# Patient Record
Sex: FEMALE | Race: WHITE | NOT HISPANIC OR LATINO | Employment: FULL TIME | URBAN - METROPOLITAN AREA
[De-identification: names, ages, dates, MRNs, and addresses within clinical notes are randomized per-mention and may not be internally consistent; named-entity substitution may affect disease eponyms.]

---

## 2024-10-22 ENCOUNTER — OFFICE VISIT (OUTPATIENT)
Dept: URGENT CARE | Facility: CLINIC | Age: 39
End: 2024-10-22

## 2024-10-22 DIAGNOSIS — B96.89 BACTERIAL VAGINOSIS: Primary | ICD-10-CM

## 2024-10-22 DIAGNOSIS — N76.0 BACTERIAL VAGINOSIS: Primary | ICD-10-CM

## 2024-10-22 RX ORDER — METRONIDAZOLE 500 MG/1
500 TABLET ORAL EVERY 8 HOURS SCHEDULED
Qty: 21 TABLET | Refills: 0 | Status: SHIPPED | OUTPATIENT
Start: 2024-10-22 | End: 2024-10-29

## 2024-10-22 NOTE — PATIENT INSTRUCTIONS
We saw you today for likely bacterial vaginosis.  As we discussed, we are unable to test you for that today.  Trichomonas is another possible diagnosis.  The treatment for both bacterial vaginosis and trichomonas is the same.  You do have an appointment scheduled with your gynecologist. Should you have return of symptoms or if your symptoms do not resolve, see your gynecologist sooner.    Do not drink alcohol while taking this medication as it will make you really sick.  Take the medication as prescribed and until completed.

## 2024-10-22 NOTE — PROGRESS NOTES
Assessment/Plan    Bacterial vaginosis [N76.0, B96.89]  1. Bacterial vaginosis  metroNIDAZOLE (FLAGYL) 500 mg tablet            Subjective:     Patient ID: Maria D Russell is a 38 y.o. female.      Reason For Visit / Chief Complaint  Chief Complaint   Patient presents with    Vaginal Discharge         This is a 38-year-old female patient who presents to the wellness center.  Patient is complaining of a foul-smelling discharge for approximately 3 days.  Patient states that she finished her.  Approximately 1 week ago patient denies fever or chills.  Patient denies abdominal pain or difficulty urinating.  Patient has 1 sexual partner.  She does report that they did have a.  Where they were not together for some time.  Patient states that she has had bacterial vaginosis in the past feels similar.    Vaginal Discharge  The patient's primary symptoms include a genital odor and vaginal discharge. The patient's pertinent negatives include no genital itching, genital rash or pelvic pain. This is a new problem. The current episode started in the past 7 days. The patient is experiencing no pain. She is not pregnant. Pertinent negatives include no abdominal pain, chills, dysuria, fever, flank pain, frequency, headaches, hematuria or urgency. The vaginal discharge was clear and malodorous. She is sexually active. It is possible that her partner has an STD.       Past Medical History:   Diagnosis Date    Allergic rhinitis     Anxiety     Depression        Past Surgical History:   Procedure Laterality Date    BREAST EXCISIONAL BIOPSY Right 2003    TONSILLECTOMY      VAGINAL DELIVERY         No family history on file.    Review of Systems   Constitutional:  Negative for chills and fever.   Gastrointestinal:  Negative for abdominal pain.   Genitourinary:  Positive for vaginal discharge. Negative for decreased urine volume, difficulty urinating, dyspareunia, dysuria, flank pain, frequency, genital sores, hematuria, menstrual  problem, pelvic pain and urgency.   Neurological:  Negative for headaches.       Objective:    There were no vitals taken for this visit.    Physical Exam  Constitutional:       Appearance: Normal appearance. She is normal weight.   HENT:      Head: Normocephalic.   Pulmonary:      Breath sounds: Normal breath sounds.   Musculoskeletal:         General: Normal range of motion.   Skin:     General: Skin is warm and dry.   Neurological:      Mental Status: She is alert and oriented to person, place, and time.   Psychiatric:         Mood and Affect: Mood normal.       Patient is aware that we cannot do testing for bacterial vaginosis at [facility] center.  Patient is also aware that her symptoms can be related to trichomonas.  Treatment for bacterial vaginosis and trichomonas are similar.  Flagyl will cover both.  We will treat patient today for symptoms.  Patient is aware that she does need to follow-up with her gynecologist and will need to watch out for repeat/recurrent infection.  Patient is a good historian. She has an appointment in January with her gynecologist but will try to go sooner.

## 2024-10-25 ENCOUNTER — OFFICE VISIT (OUTPATIENT)
Dept: URGENT CARE | Age: 39
End: 2024-10-25
Payer: COMMERCIAL

## 2024-10-25 VITALS
SYSTOLIC BLOOD PRESSURE: 136 MMHG | WEIGHT: 140.4 LBS | TEMPERATURE: 97.6 F | HEART RATE: 54 BPM | OXYGEN SATURATION: 99 % | BODY MASS INDEX: 26.51 KG/M2 | HEIGHT: 61 IN | DIASTOLIC BLOOD PRESSURE: 78 MMHG | RESPIRATION RATE: 18 BRPM

## 2024-10-25 DIAGNOSIS — H57.89 IRRITATION OF LEFT EYE: Primary | ICD-10-CM

## 2024-10-25 PROCEDURE — 99213 OFFICE O/P EST LOW 20 MIN: CPT | Performed by: EMERGENCY MEDICINE

## 2024-10-25 RX ORDER — AZELASTINE HYDROCHLORIDE 0.5 MG/ML
1 SOLUTION/ DROPS OPHTHALMIC 2 TIMES DAILY
Qty: 6 ML | Refills: 1 | Status: SHIPPED | OUTPATIENT
Start: 2024-10-25 | End: 2024-11-24

## 2024-10-25 RX ORDER — ERYTHROMYCIN 5 MG/G
0.5 OINTMENT OPHTHALMIC
Qty: 7 G | Refills: 0 | Status: SHIPPED | OUTPATIENT
Start: 2024-10-25 | End: 2024-10-25

## 2024-10-25 RX ORDER — ERYTHROMYCIN 5 MG/G
0.5 OINTMENT OPHTHALMIC 4 TIMES DAILY
Qty: 28 G | Refills: 0 | Status: SHIPPED | OUTPATIENT
Start: 2024-10-25 | End: 2024-11-01

## 2024-10-25 NOTE — PROGRESS NOTES
[facility]        NAME: Maria D Espinal is a 38 y.o. female  : 1985    MRN: [id number]  DATE: 2024  TIME: 5:07 PM    Assessment and Plan   Irritation of left eye [H57.89]  1. Irritation of left eye  azelastine (OPTIVAR) 0.05 % ophthalmic solution    erythromycin (ILOTYCIN) ophthalmic ointment    DISCONTINUED: erythromycin (ILOTYCIN) ophthalmic ointment            Patient Instructions       Follow up with PCP in 3-5 days.  Proceed to  ER if symptoms worsen.    If tests have been performed at [facility], our office will contact you with results if changes need to be made to the care plan discussed with you at the visit.  [facility].    Chief Complaint     Chief Complaint   Patient presents with    Eye Pain     C/o left eye irritation starting about a half hour ago reports that her eye became itchy and then red and swollen.          History of Present Illness       Eye Pain   The left eye is affected. This is a new problem. The current episode started today. The problem occurs constantly. The problem has been waxing and waning. There was no injury mechanism. The pain is at a severity of 0/10. The patient is experiencing no pain. There is No known exposure to pink eye. She Does not wear contacts. Associated symptoms include an eye discharge, eye redness and itching. Pertinent negatives include no blurred vision, double vision, fever, foreign body sensation, nausea, photophobia, recent URI, vomiting or weakness. She has tried nothing for the symptoms.       Review of Systems   Review of Systems   Constitutional:  Negative for activity change, appetite change, chills, diaphoresis, fatigue, fever and unexpected weight change.   HENT:  Negative for congestion, dental problem, drooling, ear discharge, ear pain, facial swelling, hearing loss, mouth sores, nosebleeds, postnasal drip, rhinorrhea, sinus pressure, sinus pain, sneezing, sore throat,  tinnitus, trouble swallowing and voice change.    Eyes:  Positive for pain, discharge, redness and itching. Negative for blurred vision, double vision, photophobia and visual disturbance.   Respiratory:  Negative for cough and shortness of breath.    Cardiovascular:  Negative for chest pain, palpitations and leg swelling.   Gastrointestinal:  Negative for abdominal distention, abdominal pain, anal bleeding, blood in stool, constipation, diarrhea, nausea, rectal pain and vomiting.   Genitourinary:  Negative for dysuria and hematuria.   Musculoskeletal:  Negative for arthralgias and back pain.   Skin:  Negative for color change, pallor, rash and wound.   Neurological:  Negative for dizziness, tremors, seizures, syncope, facial asymmetry, speech difficulty, weakness, light-headedness, numbness and headaches.   All other systems reviewed and are negative.        Current Medications       Current Outpatient Medications:     azelastine (OPTIVAR) 0.05 % ophthalmic solution, Administer 1 drop into the left eye 2 (two) times a day, Disp: 6 mL, Rfl: 1    erythromycin (ILOTYCIN) ophthalmic ointment, Administer 0.5 inches into the left eye 4 (four) times a day for 7 days, Disp: 28 g, Rfl: 0    albuterol (Ventolin HFA) 90 mcg/act inhaler, Inhale 2 puffs every 6 (six) hours as needed for wheezing, Disp: 18 g, Rfl: 0    ALPRAZolam (XANAX) 0.5 mg tablet, Take 0.5 mg by mouth 2 (two) times a day, Disp: , Rfl:     desogestrel-ethinyl estradiol (KARIVA) 0.15-0.02/0.01 MG (21/5) per tablet, Take 1 tablet by mouth (Patient not taking: Reported on 6/12/2023), Disp: , Rfl:     fluticasone (FLONASE) 50 mcg/act nasal spray, 2 sprays into each nostril daily, Disp: 9.9 mL, Rfl: 0    loratadine (CLARITIN) 10 mg tablet, Take 1 tablet (10 mg total) by mouth daily, Disp: 30 tablet, Rfl: 0    metroNIDAZOLE (FLAGYL) 500 mg tablet, Take 1 tablet (500 mg total) by mouth every 8 (eight) hours for 7 days, Disp: 21 tablet, Rfl: 0    PARoxetine (PAXIL)  "40 MG tablet, Take 40 mg by mouth, Disp: , Rfl:     Propylene Glycol (Systane Complete) 0.6 % SOLN, Apply 1 drop to eye 2 (two) times a day, Disp: 15 mL, Rfl: 0    QUEtiapine (SEROquel) 25 mg tablet, Take 25 mg by mouth, Disp: , Rfl:     Current Allergies     Allergies as of 10/25/2024 - Reviewed 10/25/2024   Allergen Reaction Noted    Prednisone Other (See Comments) 05/31/2009            The following portions of the patient's history were reviewed and updated as appropriate: allergies, current medications, past family history, past medical history, past social history, past surgical history and problem list.     Past Medical History:   Diagnosis Date    Allergic rhinitis     Anxiety     Depression        Past Surgical History:   Procedure Laterality Date    BREAST EXCISIONAL BIOPSY Right 2003    TONSILLECTOMY      VAGINAL DELIVERY         No family history on file.      Medications have been verified.        Objective   /78   Pulse (!) 54   Temp 97.6 °F (36.4 °C) (Tympanic)   Resp 18   Ht 5' 1\" (1.549 m)   Wt 63.7 kg (140 lb 6.4 oz)   SpO2 99%   BMI 26.53 kg/m²   No LMP recorded.       Physical Exam     Physical Exam  Vitals and nursing note reviewed.   Constitutional:       General: She is not in acute distress.     Appearance: Normal appearance. She is normal weight. She is not ill-appearing, toxic-appearing or diaphoretic.   HENT:      Head: Normocephalic and atraumatic.      Right Ear: External ear normal.      Left Ear: External ear normal.      Nose: Nose normal.      Mouth/Throat:      Mouth: Mucous membranes are moist.      Pharynx: No oropharyngeal exudate or posterior oropharyngeal erythema.   Eyes:      General: Lids are normal. Lids are everted, no foreign bodies appreciated. Vision grossly intact. Gaze aligned appropriately. No allergic shiner, visual field deficit or scleral icterus.        Right eye: No foreign body, discharge or hordeolum.         Left eye: No foreign body, discharge " or hordeolum.      Extraocular Movements: Extraocular movements intact.      Right eye: Normal extraocular motion and no nystagmus.      Left eye: Normal extraocular motion and no nystagmus.      Conjunctiva/sclera:      Right eye: Right conjunctiva is not injected. No chemosis, exudate or hemorrhage.     Left eye: Left conjunctiva is injected. No chemosis, exudate or hemorrhage.     Pupils: Pupils are equal, round, and reactive to light. Pupils are equal.      Visual Fields: Right eye visual fields normal and left eye visual fields normal.   Cardiovascular:      Rate and Rhythm: Normal rate and regular rhythm.      Pulses: Normal pulses.           Carotid pulses are 2+ on the right side and 2+ on the left side.       Radial pulses are 2+ on the right side and 2+ on the left side.      Heart sounds: No murmur heard.     No friction rub. No gallop.   Pulmonary:      Effort: Pulmonary effort is normal. No respiratory distress.      Breath sounds: Normal breath sounds. No stridor. No wheezing, rhonchi or rales.   Chest:      Chest wall: No tenderness.   Abdominal:      General: Abdomen is flat.      Palpations: Abdomen is soft.   Musculoskeletal:         General: No swelling, tenderness, deformity or signs of injury.      Cervical back: Normal range of motion and neck supple.      Right lower leg: No edema.      Left lower leg: No edema.   Skin:     General: Skin is warm and dry.   Neurological:      General: No focal deficit present.      Mental Status: She is alert and oriented to person, place, and time.      Cranial Nerves: No cranial nerve deficit.      Sensory: No sensory deficit.      Motor: No weakness.      Coordination: Coordination normal.      Gait: Gait normal.   Psychiatric:         Mood and Affect: Mood normal.         Behavior: Behavior normal.

## 2025-01-30 ENCOUNTER — OFFICE VISIT (OUTPATIENT)
Dept: URGENT CARE | Age: 40
End: 2025-01-30
Payer: COMMERCIAL

## 2025-01-30 VITALS
BODY MASS INDEX: 26.45 KG/M2 | SYSTOLIC BLOOD PRESSURE: 102 MMHG | OXYGEN SATURATION: 98 % | HEART RATE: 85 BPM | DIASTOLIC BLOOD PRESSURE: 70 MMHG | RESPIRATION RATE: 18 BRPM | WEIGHT: 140 LBS | TEMPERATURE: 98.1 F

## 2025-01-30 DIAGNOSIS — H57.89 IRRITATION OF BOTH EYES: Primary | ICD-10-CM

## 2025-01-30 PROCEDURE — 99213 OFFICE O/P EST LOW 20 MIN: CPT | Performed by: EMERGENCY MEDICINE

## 2025-01-30 RX ORDER — OLOPATADINE HYDROCHLORIDE 1 MG/ML
1 SOLUTION/ DROPS OPHTHALMIC 2 TIMES DAILY
Qty: 5 ML | Refills: 3 | Status: SHIPPED | OUTPATIENT
Start: 2025-01-30 | End: 2025-03-01

## 2025-01-30 RX ORDER — ERYTHROMYCIN 5 MG/G
0.5 OINTMENT OPHTHALMIC
Qty: 7 G | Refills: 0 | Status: SHIPPED | OUTPATIENT
Start: 2025-01-30 | End: 2025-02-06

## 2025-01-30 RX ORDER — FLUOXETINE 40 MG/1
40 CAPSULE ORAL DAILY
COMMUNITY

## 2025-01-30 RX ORDER — OLOPATADINE HYDROCHLORIDE 1 MG/ML
1 SOLUTION/ DROPS OPHTHALMIC 2 TIMES DAILY
Qty: 5 ML | Refills: 3 | Status: SHIPPED | OUTPATIENT
Start: 2025-01-30 | End: 2025-01-30 | Stop reason: SDUPTHER

## 2025-01-30 NOTE — PROGRESS NOTES
[facility]        NAME: Maria D Russell is a 39 y.o. female  : 1985    MRN: [id number]  DATE: 2025  TIME: 9:22 AM    Assessment and Plan   Irritation of both eyes [H57.89]  1. Irritation of both eyes  erythromycin (ILOTYCIN) ophthalmic ointment    olopatadine (PATANOL) 0.1 % ophthalmic solution    DISCONTINUED: olopatadine (PATANOL) 0.1 % ophthalmic solution        Patient's vision is grossly intact bilaterally to confrontation.  Patient states that she has an appointment with ophthalmologist next month.  Will add Pataday for acute management of symptoms.  Patient also given erythromycin ointment to utilize nightly.  Advised patient otherwise seek care in ED if she develops significantly worsening symptoms to include headache, fever or changes in vision or vomiting.  All questions answered at bedside, patient was amenable to plan and voiced understanding.    Patient Instructions       Follow up with PCP in 3-5 days.  Proceed to  ER if symptoms worsen.    If tests have been performed at [facility], our office will contact you with results if changes need to be made to the care plan discussed with you at the visit.  You can review your full results on [facility].    Chief Complaint     Chief Complaint   Patient presents with    eye irritation     Started last Saturday with bilateral eye irritation, redness, itching. Hx of dry eye.  Attempted Benadryl, Systane and eye ointment prescribed in past.  Denies pain, change in vision.  Non contact lens wearer.           History of Present Illness       39-year-old female with history of allergic rhinitis, chronic dry eyes presents with a chief complaint of bilateral, left greater than right eye redness, irritation clear tearing itchiness and dryness worsening usual with onset of 1 week ago.  Patient seen in clinic for similar issue in the past for which she was prescribed azelastine eyedrops and erythromycin ointment which she states did  help previously.  She denies contact lens use, headaches, fever, visual changes or loss of vision.  Denies eye trauma or pain.    Eye Problem   Both eyes are affected. This is a recurrent problem. The current episode started in the past 7 days. The problem occurs constantly. The problem has been waxing and waning. There was no injury mechanism. The pain is at a severity of 0/10. The patient is experiencing no pain. There is No known exposure to pink eye. She Does not wear contacts. Associated symptoms include an eye discharge, eye redness and itching. Pertinent negatives include no blurred vision, double vision, fever, foreign body sensation, nausea, photophobia, recent URI, vomiting or weakness. She has tried eye drops for the symptoms. The treatment provided mild relief.       Review of Systems   Review of Systems   Constitutional:  Negative for activity change, appetite change, chills, diaphoresis, fatigue, fever and unexpected weight change.   HENT:  Negative for congestion, dental problem, drooling, ear discharge, ear pain, facial swelling, hearing loss, mouth sores, nosebleeds, postnasal drip, rhinorrhea, sinus pressure, sinus pain, sneezing, sore throat, tinnitus, trouble swallowing and voice change.    Eyes:  Positive for discharge, redness and itching. Negative for blurred vision, double vision, photophobia, pain and visual disturbance.   Respiratory:  Negative for apnea, cough, choking, chest tightness, shortness of breath, wheezing and stridor.    Cardiovascular:  Negative for chest pain and palpitations.   Gastrointestinal:  Negative for abdominal pain, nausea and vomiting.   Genitourinary:  Negative for dysuria and hematuria.   Musculoskeletal:  Negative for arthralgias and back pain.   Skin:  Negative for color change and rash.   Neurological:  Negative for dizziness, tremors, seizures, syncope, facial asymmetry, speech difficulty, weakness, light-headedness, numbness and headaches.   All other systems  reviewed and are negative.        Current Medications       Current Outpatient Medications:     ALPRAZolam (XANAX) 0.5 mg tablet, Take 0.5 mg by mouth 2 (two) times a day, Disp: , Rfl:     erythromycin (ILOTYCIN) ophthalmic ointment, Administer 0.5 inches to both eyes daily at bedtime for 7 days, Disp: 7 g, Rfl: 0    FLUoxetine (PROzac) 40 MG capsule, Take 40 mg by mouth daily, Disp: , Rfl:     olopatadine (PATANOL) 0.1 % ophthalmic solution, Administer 1 drop to both eyes 2 (two) times a day, Disp: 5 mL, Rfl: 3    Propylene Glycol (Systane Complete) 0.6 % SOLN, Apply 1 drop to eye 2 (two) times a day, Disp: 15 mL, Rfl: 0    QUEtiapine (SEROquel) 25 mg tablet, Take 25 mg by mouth, Disp: , Rfl:     albuterol (Ventolin HFA) 90 mcg/act inhaler, Inhale 2 puffs every 6 (six) hours as needed for wheezing (Patient not taking: Reported on 1/30/2025), Disp: 18 g, Rfl: 0    azelastine (OPTIVAR) 0.05 % ophthalmic solution, Administer 1 drop into the left eye 2 (two) times a day, Disp: 6 mL, Rfl: 1    desogestrel-ethinyl estradiol (KARIVA) 0.15-0.02/0.01 MG (21/5) per tablet, Take 1 tablet by mouth (Patient not taking: Reported on 1/30/2025), Disp: , Rfl:     fluticasone (FLONASE) 50 mcg/act nasal spray, 2 sprays into each nostril daily, Disp: 9.9 mL, Rfl: 0    loratadine (CLARITIN) 10 mg tablet, Take 1 tablet (10 mg total) by mouth daily (Patient not taking: Reported on 1/30/2025), Disp: 30 tablet, Rfl: 0    PARoxetine (PAXIL) 40 MG tablet, Take 40 mg by mouth (Patient not taking: Reported on 1/30/2025), Disp: , Rfl:     Current Allergies     Allergies as of 01/30/2025 - Reviewed 01/30/2025   Allergen Reaction Noted    Prednisone Other (See Comments) 05/31/2009            The following portions of the patient's history were reviewed and updated as appropriate: allergies, current medications, past family history, past medical history, past social history, past surgical history and problem list.     Past Medical History:    Diagnosis Date    Allergic rhinitis     Anxiety     Depression        Past Surgical History:   Procedure Laterality Date    BREAST EXCISIONAL BIOPSY Right 2003    TONSILLECTOMY      VAGINAL DELIVERY         No family history on file.      Medications have been verified.        Objective   /70   Pulse 85   Temp 98.1 °F (36.7 °C)   Resp 18   Wt 63.5 kg (140 lb)   LMP 01/16/2025 (Approximate)   SpO2 98%   BMI 26.45 kg/m²   Patient's last menstrual period was 01/16/2025 (approximate).       Physical Exam     Physical Exam  Vitals and nursing note reviewed.   Constitutional:       General: She is not in acute distress.     Appearance: Normal appearance. She is normal weight. She is not ill-appearing, toxic-appearing or diaphoretic.   HENT:      Head: Normocephalic and atraumatic.      Right Ear: Tympanic membrane, ear canal and external ear normal. There is no impacted cerumen.      Left Ear: Tympanic membrane, ear canal and external ear normal. There is no impacted cerumen.      Nose: Nose normal.      Mouth/Throat:      Mouth: Mucous membranes are moist.      Pharynx: No oropharyngeal exudate or posterior oropharyngeal erythema.   Eyes:      General: Lids are normal. Vision grossly intact. Gaze aligned appropriately. No allergic shiner, visual field deficit or scleral icterus.        Right eye: No foreign body, discharge or hordeolum.         Left eye: No foreign body, discharge or hordeolum.      Extraocular Movements: Extraocular movements intact.      Right eye: Normal extraocular motion and no nystagmus.      Left eye: Normal extraocular motion and no nystagmus.      Conjunctiva/sclera:      Right eye: Right conjunctiva is injected. No chemosis, exudate or hemorrhage.     Left eye: Left conjunctiva is injected. No chemosis, exudate or hemorrhage.     Pupils: Pupils are equal, round, and reactive to light.      Comments: Mild, bilateral conjunctival injection noted.  There is no APD, proptosis,  periorbital swelling, or pain with extraocular movements noted.   Cardiovascular:      Rate and Rhythm: Normal rate and regular rhythm.      Pulses: Normal pulses.           Carotid pulses are 2+ on the right side and 2+ on the left side.       Radial pulses are 2+ on the right side and 2+ on the left side.      Heart sounds: No murmur heard.     No friction rub. No gallop.   Pulmonary:      Effort: Pulmonary effort is normal. No respiratory distress.      Breath sounds: Normal breath sounds. No stridor. No wheezing, rhonchi or rales.   Chest:      Chest wall: No tenderness.   Abdominal:      General: Abdomen is flat.      Palpations: Abdomen is soft.   Musculoskeletal:         General: No swelling, tenderness, deformity or signs of injury.      Cervical back: Normal range of motion and neck supple.      Right lower leg: No edema.      Left lower leg: No edema.   Skin:     General: Skin is warm and dry.      Capillary Refill: Capillary refill takes less than 2 seconds.      Coloration: Skin is not jaundiced or pale.      Findings: No bruising, erythema, lesion or rash.   Neurological:      General: No focal deficit present.      Mental Status: She is alert and oriented to person, place, and time.      Cranial Nerves: No cranial nerve deficit.      Sensory: No sensory deficit.      Motor: No weakness.      Coordination: Coordination normal.      Gait: Gait normal.   Psychiatric:         Mood and Affect: Mood normal.         Behavior: Behavior normal.

## 2025-04-01 ENCOUNTER — OFFICE VISIT (OUTPATIENT)
Dept: URGENT CARE | Facility: CLINIC | Age: 40
End: 2025-04-01

## 2025-04-01 VITALS
DIASTOLIC BLOOD PRESSURE: 70 MMHG | TEMPERATURE: 98 F | RESPIRATION RATE: 18 BRPM | HEART RATE: 61 BPM | SYSTOLIC BLOOD PRESSURE: 115 MMHG

## 2025-04-01 DIAGNOSIS — J30.1 SEASONAL ALLERGIC RHINITIS DUE TO POLLEN: Primary | ICD-10-CM

## 2025-04-01 RX ORDER — FLUTICASONE PROPIONATE 50 MCG
1 SPRAY, SUSPENSION (ML) NASAL DAILY
Qty: 18.2 ML | Refills: 0 | Status: SHIPPED | OUTPATIENT
Start: 2025-04-01

## 2025-04-01 RX ORDER — LORATADINE 10 MG/1
10 TABLET ORAL DAILY
Qty: 30 TABLET | Refills: 0 | Status: SHIPPED | OUTPATIENT
Start: 2025-04-01

## 2025-04-01 NOTE — PROGRESS NOTES
NAME: Maria D Russell is a 39 y.o. female  : 1985    MRN: [id number]  DATE: 2025  TIME: 8:51 AM    Assessment and Plan   Seasonal allergic rhinitis due to pollen [J30.1]  1. Seasonal allergic rhinitis due to pollen  loratadine (CLARITIN) 10 mg tablet    fluticasone (FLONASE) 50 mcg/act nasal spray        Advised to use saline sinus rinse bid and follow with Flonase bid. Restart Claritin daily. Symptoms and exam consistent with flare of seasonal allergies due to recent weather changes, no signs of bacterial infection on exam.     Patient Instructions       Follow up with PCP in 3-5 days.  Proceed to  ER if symptoms worsen.    If tests are performed, our office will contact you with results only if changes need to made to the care plan discussed with you at the visit. You can review your full results on [facility] Mychart.    Chief Complaint     Chief Complaint   Patient presents with    Sore Throat     Sinus symptoms         History of Present Illness       Patient states she does have allergies and symptoms flare this time of year. She has not been taking any allergy medication bc she cannot afford to buy it otc.     Sinusitis  This is a new problem. The current episode started in the past 7 days (3 days). The problem is unchanged. There has been no fever. Associated symptoms include congestion and a sore throat. Pertinent negatives include no chills, coughing, diaphoresis, ear pain, headaches, hoarse voice, neck pain, shortness of breath, sinus pressure, sneezing or swollen glands. Past treatments include nothing. The treatment provided no relief.       Review of Systems   Review of Systems   Constitutional:  Negative for chills and diaphoresis.   HENT:  Positive for congestion, postnasal drip, rhinorrhea and sore throat. Negative for ear pain, hoarse voice, sinus pressure and sneezing.    Respiratory:  Negative for cough and shortness of breath.    Musculoskeletal:  Negative for neck pain.    Neurological:  Negative for headaches.         Current Medications       Current Outpatient Medications:     fluticasone (FLONASE) 50 mcg/act nasal spray, 1 spray into each nostril daily, Disp: 18.2 mL, Rfl: 0    loratadine (CLARITIN) 10 mg tablet, Take 1 tablet (10 mg total) by mouth daily, Disp: 30 tablet, Rfl: 0    albuterol (Ventolin HFA) 90 mcg/act inhaler, Inhale 2 puffs every 6 (six) hours as needed for wheezing (Patient not taking: Reported on 1/30/2025), Disp: 18 g, Rfl: 0    ALPRAZolam (XANAX) 0.5 mg tablet, Take 0.5 mg by mouth 2 (two) times a day, Disp: , Rfl:     azelastine (OPTIVAR) 0.05 % ophthalmic solution, Administer 1 drop into the left eye 2 (two) times a day, Disp: 6 mL, Rfl: 1    desogestrel-ethinyl estradiol (KARIVA) 0.15-0.02/0.01 MG (21/5) per tablet, Take 1 tablet by mouth (Patient not taking: Reported on 1/30/2025), Disp: , Rfl:     FLUoxetine (PROzac) 40 MG capsule, Take 40 mg by mouth daily, Disp: , Rfl:     loratadine (CLARITIN) 10 mg tablet, Take 1 tablet (10 mg total) by mouth daily (Patient not taking: Reported on 1/30/2025), Disp: 30 tablet, Rfl: 0    olopatadine (PATANOL) 0.1 % ophthalmic solution, Administer 1 drop to both eyes 2 (two) times a day, Disp: 5 mL, Rfl: 3    PARoxetine (PAXIL) 40 MG tablet, Take 40 mg by mouth (Patient not taking: Reported on 1/30/2025), Disp: , Rfl:     Propylene Glycol (Systane Complete) 0.6 % SOLN, Apply 1 drop to eye 2 (two) times a day, Disp: 15 mL, Rfl: 0    QUEtiapine (SEROquel) 25 mg tablet, Take 25 mg by mouth, Disp: , Rfl:     Current Allergies     Allergies as of 04/01/2025 - Reviewed 01/30/2025   Allergen Reaction Noted    Prednisone Other (See Comments) 05/31/2009            The following portions of the patient's history were reviewed and updated as appropriate: allergies, current medications, past family history, past medical history, past social history, past surgical history and problem list.     Past Medical History:   Diagnosis  Date    Allergic rhinitis     Anxiety     Depression        Past Surgical History:   Procedure Laterality Date    BREAST EXCISIONAL BIOPSY Right 2003    TONSILLECTOMY      VAGINAL DELIVERY         No family history on file.      Medications have been verified.        Objective   /70 (BP Location: Left arm, Patient Position: Sitting, Cuff Size: Standard)   Pulse 61   Temp 98 °F (36.7 °C) (Tympanic)   Resp 18        Physical Exam     Physical Exam  Vitals and nursing note reviewed.   Constitutional:       General: She is not in acute distress.     Appearance: Normal appearance. She is not ill-appearing.   HENT:      Head: Normocephalic and atraumatic.      Right Ear: Tympanic membrane, ear canal and external ear normal.      Left Ear: Tympanic membrane, ear canal and external ear normal.      Nose: Congestion and rhinorrhea present.      Mouth/Throat:      Mouth: Mucous membranes are moist.      Pharynx: Oropharynx is clear. No oropharyngeal exudate or posterior oropharyngeal erythema.   Cardiovascular:      Rate and Rhythm: Normal rate and regular rhythm.      Heart sounds: Normal heart sounds, S1 normal and S2 normal.   Pulmonary:      Effort: Pulmonary effort is normal. No accessory muscle usage.      Breath sounds: Normal breath sounds. No wheezing or rhonchi.   Skin:     General: Skin is warm and dry.      Capillary Refill: Capillary refill takes less than 2 seconds.      Findings: No rash.   Neurological:      General: No focal deficit present.      Mental Status: She is alert and oriented to person, place, and time.      Motor: Motor function is intact.   Psychiatric:         Attention and Perception: Attention and perception normal.         Mood and Affect: Mood and affect normal.         Speech: Speech normal.         Behavior: Behavior normal. Behavior is cooperative.         Thought Content: Thought content normal.

## 2025-05-02 DIAGNOSIS — Z00.6 ENCOUNTER FOR EXAMINATION FOR NORMAL COMPARISON OR CONTROL IN CLINICAL RESEARCH PROGRAM: ICD-10-CM

## 2025-06-26 ENCOUNTER — OFFICE VISIT (OUTPATIENT)
Dept: URGENT CARE | Facility: CLINIC | Age: 40
End: 2025-06-26

## 2025-06-26 VITALS — SYSTOLIC BLOOD PRESSURE: 141 MMHG | HEART RATE: 56 BPM | OXYGEN SATURATION: 98 % | DIASTOLIC BLOOD PRESSURE: 61 MMHG

## 2025-06-26 DIAGNOSIS — R30.0 DYSURIA: Primary | ICD-10-CM

## 2025-06-26 LAB
SL AMB  POCT GLUCOSE, UA: NEGATIVE
SL AMB LEUKOCYTE ESTERASE,UA: ABNORMAL
SL AMB POCT BILIRUBIN,UA: ABNORMAL
SL AMB POCT BLOOD,UA: ABNORMAL
SL AMB POCT CLARITY,UA: ABNORMAL
SL AMB POCT COLOR,UA: YELLOW
SL AMB POCT KETONES,UA: NEGATIVE
SL AMB POCT NITRITE,UA: NEGATIVE
SL AMB POCT PH,UA: 6
SL AMB POCT SPECIFIC GRAVITY,UA: 1.01
SL AMB POCT URINE PROTEIN: ABNORMAL
SL AMB POCT UROBILINOGEN: 0.2

## 2025-06-26 PROCEDURE — 87086 URINE CULTURE/COLONY COUNT: CPT

## 2025-06-26 RX ORDER — NITROFURANTOIN 25; 75 MG/1; MG/1
100 CAPSULE ORAL 2 TIMES DAILY
Qty: 10 CAPSULE | Refills: 0 | Status: SHIPPED | OUTPATIENT
Start: 2025-06-26 | End: 2025-07-01

## 2025-06-26 NOTE — PROGRESS NOTES
[facility]        NAME: Maria D Russell is a 39 y.o. female  : 1985    MRN: [id number]  DATE: 2025  TIME: 11:37 AM    Assessment and Plan   Dysuria [R30.0]  1. Dysuria  nitrofurantoin (MACROBID) 100 mg capsule    POCT urine dip    Urine culture    Urine culture        POCT urine dip:  Color: Yellow  UA, Clarity: Hazy  Glucose: Negative  Bilirubin: Small  Ketones: Negative  Specific gravity: 1.015  Blood: NH Moderate  pH: 6  Protein: Trace  Urobilinogen: 0.2  Nitrates: Negative  Leukocytes: Moderate    Will send urine culture. Depending on urine culture results and sensitivity, will either keep the same antibiotics or will change to a different antibiotic. Will contact patient if antibiotic is being changed. Check results in 1 to 2 days.  If there is less than 100,000 found in your urine, you may discontinue the prescribed antibiotic. Follow-up with our office if any questions/concerns.    Patient Instructions   Take antibiotics as prescribed.   Take entire course of antibiotics.     Eat yogurt with live and active cultures and/or take a probiotic at least 3 hours before or after antibiotic dose.   Monitor stool for diarrhea and/or blood. If this occurs, contact primary care doctor ASAP.     Recommend adequate hydration and drinking plenty of fluids    May try over-the-counter Azo for bladder relief.  Recommend avoiding dehydrating fluids including caffeinated or alcoholic beverages  Reviewed proper toileting hygiene, wipe front to back.  Empty bladder frequently.    Other treatments for prevention of UTI's include:  Avoiding constipation  Urinating before and after sexual intercourse   Taking a daily probiotic  D-mannose 2g daily  Cranberry 500 mg daily    Follow up with PCP or established Urologist in 3-5 days.    If you develop any high fever, severe back pain, abdominal pain, nausea, vomiting, unable to urinate or any new or worsening symptoms, please proceed ER.     Follow up with PCP  in 3-5 days.  Proceed to ER if symptoms worsen.    If tests are performed, our office will contact you with results only if changes need to made to the care plan discussed with you at the visit. You can review your full results on [facility].    Chief Complaint     Chief Complaint   Patient presents with   • Possible UTI         History of Present Illness       Urinary Tract Infection   This is a new problem. The current episode started yesterday. The problem occurs every urination. The problem has been gradually worsening. The quality of the pain is described as burning (when wiping and after urination). Associated symptoms include frequency (baseline) and nausea. Pertinent negatives include no chills, urgency or vomiting.       Review of Systems   Review of Systems   Constitutional:  Negative for chills, diaphoresis and fever.   Respiratory: Negative.  Negative for cough, shortness of breath and wheezing.    Cardiovascular:  Negative for chest pain and palpitations.   Gastrointestinal:  Positive for nausea. Negative for abdominal pain and vomiting.   Genitourinary:  Positive for dysuria and frequency (baseline). Negative for urgency and vaginal discharge.   Musculoskeletal:  Negative for myalgias.   Skin:  Negative for color change and wound.     Current Medications     Current Medications[1]    Current Allergies     Allergies as of 06/26/2025 - Reviewed 06/26/2025   Allergen Reaction Noted   • Prednisone Other (See Comments) 05/31/2009            The following portions of the patient's history were reviewed and updated as appropriate: allergies, current medications, past family history, past medical history, past social history, past surgical history and problem list.     Past Medical History[2]    Past Surgical History[3]    Family History[4]      Medications have been verified.        Objective   /61 (BP Location: Right arm, Patient Position: Sitting, Cuff Size: Standard)   Pulse 56   SpO2 98%         Physical Exam     Physical Exam  Vitals and nursing note reviewed.   Constitutional:       General: She is not in acute distress.     Appearance: Normal appearance. She is not ill-appearing, toxic-appearing or diaphoretic.   HENT:      Head: Normocephalic.     Cardiovascular:      Rate and Rhythm: Normal rate and regular rhythm.      Pulses: Normal pulses.      Heart sounds: Normal heart sounds. No murmur heard.  Pulmonary:      Effort: Pulmonary effort is normal. No respiratory distress.      Breath sounds: Normal breath sounds. No stridor. No wheezing, rhonchi or rales.   Chest:      Chest wall: No tenderness.   Abdominal:      Tenderness: There is no right CVA tenderness or left CVA tenderness.     Musculoskeletal:         General: Normal range of motion.     Skin:     General: Skin is warm.     Neurological:      Mental Status: She is alert.                          [1]    Current Outpatient Medications:   •  nitrofurantoin (MACROBID) 100 mg capsule, Take 1 capsule (100 mg total) by mouth 2 (two) times a day for 5 days, Disp: 10 capsule, Rfl: 0  •  albuterol (Ventolin HFA) 90 mcg/act inhaler, Inhale 2 puffs every 6 (six) hours as needed for wheezing (Patient not taking: Reported on 1/30/2025), Disp: 18 g, Rfl: 0  •  ALPRAZolam (XANAX) 0.5 mg tablet, Take 0.5 mg by mouth 2 (two) times a day, Disp: , Rfl:   •  azelastine (OPTIVAR) 0.05 % ophthalmic solution, Administer 1 drop into the left eye 2 (two) times a day, Disp: 6 mL, Rfl: 1  •  desogestrel-ethinyl estradiol (KARIVA) 0.15-0.02/0.01 MG (21/5) per tablet, Take 1 tablet by mouth (Patient not taking: Reported on 1/30/2025), Disp: , Rfl:   •  FLUoxetine (PROzac) 40 MG capsule, Take 40 mg by mouth daily, Disp: , Rfl:   •  fluticasone (FLONASE) 50 mcg/act nasal spray, 1 spray into each nostril daily, Disp: 18.2 mL, Rfl: 0  •  loratadine (CLARITIN) 10 mg tablet, Take 1 tablet (10 mg total) by mouth daily (Patient not taking: Reported on 1/30/2025), Disp: 30  tablet, Rfl: 0  •  loratadine (CLARITIN) 10 mg tablet, Take 1 tablet (10 mg total) by mouth daily, Disp: 30 tablet, Rfl: 0  •  olopatadine (PATANOL) 0.1 % ophthalmic solution, Administer 1 drop to both eyes 2 (two) times a day, Disp: 5 mL, Rfl: 3  •  PARoxetine (PAXIL) 40 MG tablet, Take 40 mg by mouth (Patient not taking: Reported on 1/30/2025), Disp: , Rfl:   •  Propylene Glycol (Systane Complete) 0.6 % SOLN, Apply 1 drop to eye 2 (two) times a day, Disp: 15 mL, Rfl: 0  •  QUEtiapine (SEROquel) 25 mg tablet, Take 25 mg by mouth, Disp: , Rfl: [2]  Past Medical History:  Diagnosis Date   • Allergic rhinitis    • Anxiety    • Depression    [3]  Past Surgical History:  Procedure Laterality Date   • BREAST EXCISIONAL BIOPSY Right 2003   • TONSILLECTOMY     • VAGINAL DELIVERY     [4]  No family history on file.

## 2025-06-27 LAB — BACTERIA UR CULT: NORMAL
